# Patient Record
Sex: FEMALE | Race: WHITE | NOT HISPANIC OR LATINO | URBAN - METROPOLITAN AREA
[De-identification: names, ages, dates, MRNs, and addresses within clinical notes are randomized per-mention and may not be internally consistent; named-entity substitution may affect disease eponyms.]

---

## 2022-12-23 ENCOUNTER — EMERGENCY (EMERGENCY)
Facility: HOSPITAL | Age: 62
LOS: 1 days | Discharge: HOME | End: 2022-12-23
Attending: STUDENT IN AN ORGANIZED HEALTH CARE EDUCATION/TRAINING PROGRAM | Admitting: EMERGENCY MEDICINE
Payer: MEDICARE

## 2022-12-23 VITALS
TEMPERATURE: 98 F | RESPIRATION RATE: 16 BRPM | HEART RATE: 106 BPM | DIASTOLIC BLOOD PRESSURE: 94 MMHG | SYSTOLIC BLOOD PRESSURE: 140 MMHG | OXYGEN SATURATION: 100 %

## 2022-12-23 LAB
ALBUMIN SERPL ELPH-MCNC: 4.8 G/DL — SIGNIFICANT CHANGE UP (ref 3.3–5)
ALP SERPL-CCNC: 89 U/L — SIGNIFICANT CHANGE UP (ref 40–120)
ALT FLD-CCNC: 25 U/L — SIGNIFICANT CHANGE UP (ref 4–33)
ANION GAP SERPL CALC-SCNC: 11 MMOL/L — SIGNIFICANT CHANGE UP (ref 7–14)
APTT BLD: 32.6 SEC — SIGNIFICANT CHANGE UP (ref 27–36.3)
AST SERPL-CCNC: 24 U/L — SIGNIFICANT CHANGE UP (ref 4–32)
BASOPHILS # BLD AUTO: 0.04 K/UL — SIGNIFICANT CHANGE UP (ref 0–0.2)
BASOPHILS NFR BLD AUTO: 0.6 % — SIGNIFICANT CHANGE UP (ref 0–2)
BILIRUB SERPL-MCNC: 0.3 MG/DL — SIGNIFICANT CHANGE UP (ref 0.2–1.2)
BUN SERPL-MCNC: 14 MG/DL — SIGNIFICANT CHANGE UP (ref 7–23)
CALCIUM SERPL-MCNC: 9.9 MG/DL — SIGNIFICANT CHANGE UP (ref 8.4–10.5)
CHLORIDE SERPL-SCNC: 104 MMOL/L — SIGNIFICANT CHANGE UP (ref 98–107)
CO2 SERPL-SCNC: 25 MMOL/L — SIGNIFICANT CHANGE UP (ref 22–31)
CREAT SERPL-MCNC: 0.71 MG/DL — SIGNIFICANT CHANGE UP (ref 0.5–1.3)
EGFR: 97 ML/MIN/1.73M2 — SIGNIFICANT CHANGE UP
EOSINOPHIL # BLD AUTO: 0.13 K/UL — SIGNIFICANT CHANGE UP (ref 0–0.5)
EOSINOPHIL NFR BLD AUTO: 1.9 % — SIGNIFICANT CHANGE UP (ref 0–6)
FLUAV AG NPH QL: SIGNIFICANT CHANGE UP
FLUBV AG NPH QL: SIGNIFICANT CHANGE UP
GLUCOSE SERPL-MCNC: 120 MG/DL — HIGH (ref 70–99)
HCT VFR BLD CALC: 44 % — SIGNIFICANT CHANGE UP (ref 34.5–45)
HGB BLD-MCNC: 14.9 G/DL — SIGNIFICANT CHANGE UP (ref 11.5–15.5)
IANC: 4.33 K/UL — SIGNIFICANT CHANGE UP (ref 1.8–7.4)
IMM GRANULOCYTES NFR BLD AUTO: 0.1 % — SIGNIFICANT CHANGE UP (ref 0–0.9)
INR BLD: 1.02 RATIO — SIGNIFICANT CHANGE UP (ref 0.88–1.16)
LYMPHOCYTES # BLD AUTO: 1.9 K/UL — SIGNIFICANT CHANGE UP (ref 1–3.3)
LYMPHOCYTES # BLD AUTO: 27.1 % — SIGNIFICANT CHANGE UP (ref 13–44)
MCHC RBC-ENTMCNC: 29 PG — SIGNIFICANT CHANGE UP (ref 27–34)
MCHC RBC-ENTMCNC: 33.9 GM/DL — SIGNIFICANT CHANGE UP (ref 32–36)
MCV RBC AUTO: 85.6 FL — SIGNIFICANT CHANGE UP (ref 80–100)
MONOCYTES # BLD AUTO: 0.61 K/UL — SIGNIFICANT CHANGE UP (ref 0–0.9)
MONOCYTES NFR BLD AUTO: 8.7 % — SIGNIFICANT CHANGE UP (ref 2–14)
NEUTROPHILS # BLD AUTO: 4.33 K/UL — SIGNIFICANT CHANGE UP (ref 1.8–7.4)
NEUTROPHILS NFR BLD AUTO: 61.6 % — SIGNIFICANT CHANGE UP (ref 43–77)
NRBC # BLD: 0 /100 WBCS — SIGNIFICANT CHANGE UP (ref 0–0)
NRBC # FLD: 0 K/UL — SIGNIFICANT CHANGE UP (ref 0–0)
PLATELET # BLD AUTO: 195 K/UL — SIGNIFICANT CHANGE UP (ref 150–400)
POTASSIUM SERPL-MCNC: 3.9 MMOL/L — SIGNIFICANT CHANGE UP (ref 3.5–5.3)
POTASSIUM SERPL-SCNC: 3.9 MMOL/L — SIGNIFICANT CHANGE UP (ref 3.5–5.3)
PROT SERPL-MCNC: 8.1 G/DL — SIGNIFICANT CHANGE UP (ref 6–8.3)
PROTHROM AB SERPL-ACNC: 11.8 SEC — SIGNIFICANT CHANGE UP (ref 10.5–13.4)
RBC # BLD: 5.14 M/UL — SIGNIFICANT CHANGE UP (ref 3.8–5.2)
RBC # FLD: 12.9 % — SIGNIFICANT CHANGE UP (ref 10.3–14.5)
RSV RNA NPH QL NAA+NON-PROBE: SIGNIFICANT CHANGE UP
SARS-COV-2 RNA SPEC QL NAA+PROBE: SIGNIFICANT CHANGE UP
SODIUM SERPL-SCNC: 140 MMOL/L — SIGNIFICANT CHANGE UP (ref 135–145)
TROPONIN T, HIGH SENSITIVITY RESULT: <6 NG/L — SIGNIFICANT CHANGE UP
WBC # BLD: 7.02 K/UL — SIGNIFICANT CHANGE UP (ref 3.8–10.5)
WBC # FLD AUTO: 7.02 K/UL — SIGNIFICANT CHANGE UP (ref 3.8–10.5)

## 2022-12-23 PROCEDURE — 70498 CT ANGIOGRAPHY NECK: CPT | Mod: 26,MA

## 2022-12-23 PROCEDURE — 70496 CT ANGIOGRAPHY HEAD: CPT | Mod: 26,MA

## 2022-12-23 PROCEDURE — 99220: CPT | Mod: FS

## 2022-12-23 PROCEDURE — 0042T: CPT | Mod: MA

## 2022-12-23 RX ORDER — ATORVASTATIN CALCIUM 80 MG/1
80 TABLET, FILM COATED ORAL AT BEDTIME
Refills: 0 | Status: DISCONTINUED | OUTPATIENT
Start: 2022-12-23 | End: 2022-12-27

## 2022-12-23 RX ORDER — ASPIRIN/CALCIUM CARB/MAGNESIUM 324 MG
81 TABLET ORAL DAILY
Refills: 0 | Status: DISCONTINUED | OUTPATIENT
Start: 2022-12-23 | End: 2022-12-27

## 2022-12-23 RX ADMIN — Medication 81 MILLIGRAM(S): at 19:40

## 2022-12-23 RX ADMIN — ATORVASTATIN CALCIUM 80 MILLIGRAM(S): 80 TABLET, FILM COATED ORAL at 22:45

## 2022-12-23 NOTE — ED PROVIDER NOTE - PHYSICAL EXAMINATION
GENERAL: Awake. Alert. NAD. Well nourished.  HEENT: NC/AT, PERRL, EOMI, Conjunctiva pink, no scleral icterus. Airway patent. Moist mucous membranes.  LUNGS: CTAB. No wheezes or rales noted.  CARDIAC: Chest non-tender to palpation. RRR.  ABDOMEN: No masses noted. Soft, NT, ND, no rebound, no guarding.  EXT: No edema, no calf tenderness, distal pulses 2+ bilaterally  NEURO: A&Ox3. Moving all extremities. Sensation and strength intact throughout. No focal deficits. Normal finger to nose. No pronator drift. Normal gait.  SKIN: Warm and dry.  PSYCH: Normal affect.

## 2022-12-23 NOTE — ED ADULT NURSE REASSESSMENT NOTE - NS ED NURSE REASSESS COMMENT FT1
Patient received in stretcher. AOX4. Respirations even and unlabored. Smile symmetric. Speech spontaneous. +/= sensation BL. +/= strength BL. Spontaneous movement of all extremities Comfort and safety maintained. All current care needs met. Care plan continued Adilson CALZADA

## 2022-12-23 NOTE — ED PROVIDER NOTE - OBJECTIVE STATEMENT
62-year-old F PMH osteoarthritis presenting to the ED with transient episode of diplopia associated with dizziness, eye pressure and head pressure about 45min prior to arrival to ED.  Upon evaluation in the ED patient states diplopia has resolved however head pressures still persists.  Denies upper extremity and lower extremity weakness.  Patient ambulating at the time of initial symptoms.  No slurred speech or aphasia.  Patient denies similar episodes in the past.   Denies chest pain, SOB, nausea, vomiting, dysuria, hematuria, diarrhea..  Denies recent illness, fever, chills.

## 2022-12-23 NOTE — ED PROVIDER NOTE - PROGRESS NOTE DETAILS
Kristen Adams DO (PGY2):  CT negative for acute pathology.  Labs nonactionable. Patient to go to CDU for MRI.

## 2022-12-23 NOTE — CONSULT NOTE ADULT - SUBJECTIVE AND OBJECTIVE BOX
Neurology - Consult Note    -  Spectra: [phone] ([facility]), [phone] ([facility])  -    HPI: Patient XENIA ESCOBAR is a 62y (1960) RH woman with a PMHx significant for HLD, fibromyalgia, osteoarthritis  has presented to the ED as a code stroke for 1 minute transient binocular diplopia on primary gaze at 12:45 PM 12/23/22. Associated with pressure behind her eyes and top of her head, burning around her eyes for about 10 minutes around that time. Denies prior episodes of such symptoms. Pt currently has some lightheadedness but denies any HA, n/v, numbness, tingling, focal weakness, changes in vision/hearing, trauma. Pt ambulates independently at base line does not need help with ADLs. Pt not a tpa candidate as symptoms have resolved. Not a thrombectomy candidate as no LVO seen on imaging.    NIH 0  preMRS 0      Review of Systems:   CONSTITUTIONAL: No fevers or chills  EYES AND ENT: No visual changes or no throat pain   NECK: No pain or stiffness  RESPIRATORY: No hemoptysis or shortness of breath  NEUROLOGICAL: +As stated in HPI above  SKIN: No itching, burning, rashes, or lesions   All other review of systems is negative unless indicated above.    Allergies: NKDA      PMHx/PSHx/Family Hx: As above, otherwise see below       Social Hx:  No current use of tobacco, alcohol, or illicit drugs    Medications:  MEDICATIONS  (STANDING):    MEDICATIONS  (PRN):      Vitals:  T(C): 36.9 (12-23-22 @ 13:17), Max: 36.9 (12-23-22 @ 13:17)  HR: 106 (12-23-22 @ 13:17) (106 - 106)  BP: 140/94 (12-23-22 @ 13:17) (140/94 - 140/94)  RR: 16 (12-23-22 @ 13:17) (16 - 16)  SpO2: 100% (12-23-22 @ 13:17) (100% - 100%)    Physical Examination:   General - NAD  Cardiovascular - Peripheral pulses palpable, no edema  Eyes - Fundoscopy not performed due to safety precautions in the setting of the COVID-19 pandemic    Neurologic Exam:  Mental status - Awake, Alert, Oriented to person, place, and time. Speech fluent, repetition and naming intact. Follows simple and complex commands. Attention/concentration, recent and remote memory (including registration and recall), and fund of knowledge intact    Cranial nerves - PERRLA, VFF, EOMI, no diplopia, face sensation (V1-V3) intact b/l, facial strength intact without asymmetry b/l, hearing intact b/l, palate with symmetric elevation, sternocleidomastiod 5/5 strength b/l, tongue midline on protrusion with full lateral movement    Motor - Normal bulk and tone throughout. No pronator drift.  Strength testing  No drift noted in extremities. Full strength throughout.     Sensation - Light touch intact throughout    Coordination - Finger to Nose intact b/l. No tremors appreciated    Gait and station - Normal casual gait.     Labs:          CAPILLARY BLOOD GLUCOSE  121 (23 Dec 2022 13:52)      POCT Blood Glucose.: 121 mg/dL (23 Dec 2022 13:17)          CSF:                  Radiology:

## 2022-12-23 NOTE — CONSULT NOTE ADULT - ATTENDING COMMENTS
Ms. Henderson is a 61 yo woman with transient diplopia consistent with TIA.  I agree with work up and management as above.   Thank you.

## 2022-12-23 NOTE — ED CDU PROVIDER INITIAL DAY NOTE - ATTENDING APP SHARED VISIT CONTRIBUTION OF CARE
Demar Guillory, DO:  patient seen and evaluated with the PA.  I was present for key portions of the History & Physical, and I agree with the Impression & Plan. 63 yo f pmh fibromyalgia, OA, hld, pw diplopia. reports that had acute onset at approx 45 min pta. resolved gradually, ocurred suddenly. associated w/ mild frontal head pressure. reports NO current diplopia and no blurry vision but has eye pressure b/l. denies paresthesias, cp, sob, cough, congestion, ha. also had mild lightheadedness. has been in usual state of health. pt arrives hds, well appearing, grossly neurovascularly intact. asked to do code stroke at triage, initiated. neuro bedside at ct. concern for tia. cdu for further eval and w/u.

## 2022-12-23 NOTE — ED PROVIDER NOTE - ATTENDING CONTRIBUTION TO CARE
Demar Guillory, DO:  patient seen and evaluated with the resident.  I was present for key portions of the History & Physical, and I agree with the Impression & Plan. 65 yo f pmh fibromyalgia, OA, hld, pw diplopia. reports that had acute onset at approx 45 min pta. resolved gradually, ocurred suddenly. associated w/ mild frontal head pressure. reports NO current diplopia and no blurry vision but has eye pressure b/l. denies paresthesias, cp, sob, cough, congestion, ha. also had mild lightheadedness. has been in usual state of health. pt arrives hds, well appearing, grossly neurovascularly intact. asked to do code stroke at triage, initiated. neuro bedside at ct. unlikely acs, no hx. does not appear like pre-syncope. labs, imaging, reassess. Demar Guillory, DO:  patient seen and evaluated with the resident.  I was present for key portions of the History & Physical, and I agree with the Impression & Plan. 63 yo f pmh fibromyalgia, OA, hld, pw diplopia. reports that had acute onset at approx 45 min pta. resolved gradually, ocurred suddenly. associated w/ mild frontal head pressure. reports NO current diplopia and no blurry vision but has eye pressure b/l. denies paresthesias, cp, sob, cough, congestion, ha. also had mild lightheadedness. has been in usual state of health. pt arrives hds, well appearing, grossly neurovascularly intact. asked to do code stroke at triage, initiated. neuro bedside at ct. unlikely acs, no hx. does not appear like pre-syncope. labs, imaging, reassess..

## 2022-12-23 NOTE — ED CDU PROVIDER INITIAL DAY NOTE - CLINICAL SUMMARY MEDICAL DECISION MAKING FREE TEXT BOX
62 year old female with PMH of OA, fibromyalgia, HLD, disc herniation presents to the ED with transient binocular diplopia prior to arrival. In ED, pt with stable hemodynamics. Neuro non-focal. Labs reviewed. CTA H/N grossly unremarkable. Pt was seen by Neuro as a code stroke in the ED, recs appreciated. CDU plan for MRI brain, neuro checks and CDU supportive care.

## 2022-12-23 NOTE — ED PROVIDER NOTE - CLINICAL SUMMARY MEDICAL DECISION MAKING FREE TEXT BOX
62-year-old F PMH osteoarthritis presenting to the ED with transient episode of diplopia associated with dizziness, eye pressure and head pressure about 45min prior to arrival to ED. Given hx and physical, ddx includes but is not limited to stroke, TIA, dehydration, electrolyte abn, anemia. Code stroke called, CT/CTA, labs, neuro recs, reassess.

## 2022-12-23 NOTE — ED ADULT TRIAGE NOTE - CHIEF COMPLAINT QUOTE
c/o sudden onset of double vision while laying down watching tv. Double vision subsided but c/o dizziness and eye and head pressure. PB=453 Hx HLD c/o sudden onset of double vision while laying down watching tv. Double vision subsided but c/o dizziness and eye and head pressure. CX=832 Hx HLD, fibromyalgia. Dr. Guillory came to eval pt in triage. Code stroke activated.

## 2022-12-23 NOTE — CONSULT NOTE ADULT - ASSESSMENT
Patient XENIA ESCOBAR is a 62y (1960) RH woman with a PMHx significant for HLD, fibromyalgia, osteoarthritis  has presented to the ED as a code stroke for 1 minute transient binocular diplopia on primary gaze at 12:45 PM 12/23/22. Associated with pressure behind her eyes and top of her head, burning around her eyes for about 10 minutes around that time. Denies prior episodes of such symptoms. Pt currently has some lightheadedness but denies any HA, n/v, numbness, tingling, focal weakness, changes in vision/hearing, trauma. Pt ambulates independently at base line does not need help with ADLs. Pt not a tpa candidate as symptoms have resolved. Not a thrombectomy candidate as no LVO seen on imaging.    NIH 0  preMRS 0  ABCD 2    Neuro exam nonfocal.    Impression: Transient binocular diplopia of unclear etiology at this time, but possibly 2/2 TIA.     Recommendations:  Imaging:   -CDU with MRI brain w/o contrast, if no contraindications  -TTE  -Can consider STAT CT head non-contrast for change in neuro exam    Secondary prevention of stroke:  -Permissive HTN up to 220/110 for 24 hours from symptom onset, gradual normotension over 2-3 days  -81 mg ASA daily   -Atorvastatin 80 mg daily (long-term goal LDL < 70)  -Tight glucose control (long-term goal HgbA1c < 6%)    Misc/additional recs:   -Neuro checks Q4  -Dysphagia screen  -Speech and swallow eval if dysphagia screen fails  -NPO except meds until dysphagia screen passed  -Head of bed > 30 degrees for aspiration prevention and aspiration precautions ordered.  -Fall precautions, aspiration precautions  -Continuous  telemetry to monitor for arrhythmia  -Stroke labwork: HgbA1C, fasting lipid panel, CBC, CMP, coag pannel, troponin  -Baseline EKG  -PT/OT  -DVT Prophylaxis: Lovenox 40 mg Sq daily unless contraindicated in which case SCDs     To be discussed with neurology attending and will be formally staffed by attending during morning rounds. Recommendations will be finalized once signed by attending.

## 2022-12-23 NOTE — ED CDU PROVIDER INITIAL DAY NOTE - OBJECTIVE STATEMENT
Initial ED provider note: "62-year-old F PMH osteoarthritis presenting to the ED with transient episode of diplopia associated with dizziness, eye pressure and head pressure about 45min prior to arrival to ED.  Upon evaluation in the ED patient states diplopia has resolved however head pressures still persists.  Denies upper extremity and lower extremity weakness.  Patient ambulating at the time of initial symptoms.  No slurred speech or aphasia.  Patient denies similar episodes in the past.   Denies chest pain, SOB, nausea, vomiting, dysuria, hematuria, diarrhea..  Denies recent illness, fever, chills."    CDU note: Agree with above. 62 year old female with PMH of OA, fibromyalgia, HLD, disc herniation presents to the ED with transient binocular diplopia prior to arrival. In ED, pt with stable hemodynamics. Neuro non-focal. Labs reviewed. CTA H/N grossly unremarkable. Pt was seen by Neuro as a code stroke in the ED, recs appreciated. CDU plan for MRI brain, neuro checks and CDU supportive care.

## 2022-12-23 NOTE — ED CDU PROVIDER INITIAL DAY NOTE - NSICDXFAMILYHX_GEN_ALL_CORE_FT
FAMILY HISTORY:  Mother  Still living? Unknown  FH: gastric cancer, Age at diagnosis: Age Unknown  FH: heart disease, Age at diagnosis: Age Unknown

## 2022-12-24 VITALS
SYSTOLIC BLOOD PRESSURE: 114 MMHG | OXYGEN SATURATION: 100 % | RESPIRATION RATE: 18 BRPM | HEART RATE: 64 BPM | DIASTOLIC BLOOD PRESSURE: 71 MMHG | TEMPERATURE: 98 F

## 2022-12-24 LAB
CHOLEST SERPL-MCNC: 148 MG/DL — SIGNIFICANT CHANGE UP
HDLC SERPL-MCNC: 45 MG/DL — LOW
LIPID PNL WITH DIRECT LDL SERPL: 77 MG/DL — SIGNIFICANT CHANGE UP
NON HDL CHOLESTEROL: 103 MG/DL — SIGNIFICANT CHANGE UP
TRIGL SERPL-MCNC: 132 MG/DL — SIGNIFICANT CHANGE UP

## 2022-12-24 PROCEDURE — 93306 TTE W/DOPPLER COMPLETE: CPT | Mod: 26

## 2022-12-24 PROCEDURE — 70551 MRI BRAIN STEM W/O DYE: CPT | Mod: 26,MA

## 2022-12-24 PROCEDURE — 99285 EMERGENCY DEPT VISIT HI MDM: CPT

## 2022-12-24 PROCEDURE — 99217: CPT

## 2022-12-24 RX ADMIN — Medication 81 MILLIGRAM(S): at 12:14

## 2022-12-24 NOTE — ED CDU PROVIDER DISPOSITION NOTE - PATIENT PORTAL LINK FT
You can access the FollowMyHealth Patient Portal offered by [facility] by registering at the following website: [email or web address]. By joining [employer] [facility]’s FollowMyHealth portal, you will also be able to view your health information using other applications (apps) compatible with our system.

## 2022-12-24 NOTE — ED CDU PROVIDER SUBSEQUENT DAY NOTE - HISTORY
61 yo female with PMH of OA, fibromyalgia, hyperlipidemia, disc herniation, presented to the ED c/o episode of transient binocular diplopia prior to ED arrival. In ED, VSS, Neuro exam unremarkable.  Neurology was consulted as colde stroke; CTA H/N grossly unremarkable.  Neuro advised MRI head; pt was dispo'd to CDU accordingly for plan: Tele monitoring, neuro checks, MRI head / recs as per Neuro team following patient; supportive care, general observation care / monitoring.  In the interim, pt objectively noted to be resting comfortably; pt has been clinically stable; no issues thus far.

## 2022-12-24 NOTE — ED ADULT NURSE NOTE - CHIEF COMPLAINT QUOTE
c/o sudden onset of double vision while laying down watching tv. Double vision subsided but c/o dizziness and eye and head pressure. PJ=554 Hx HLD, fibromyalgia. Dr. Guillory came to eval pt in triage. Code stroke activated.

## 2022-12-24 NOTE — ED CDU PROVIDER SUBSEQUENT DAY NOTE - PHYSICAL EXAMINATION
CONSTITUTIONAL:  Well appearing, awake, alert, oriented to person, place, time/situation and in no apparent distress.  Pt. is objectively comfortable appearing and verbalizing in full, clear, effortless sentences.  ENMT: NC/AT.  Airway patent.  Nasal mucosa clear.  Moist mucous membranes.  Neck supple.  EYES:  Clear OU.  EOMI OU.  CARDIAC:  Normal rate, regular rhythm.  Heart sounds S1 S2.  No murmurs, gallops, or rubs.  RESPIRATORY:  Breath sounds clear and equal bilaterally.  No wheezes, no rales, no rhonchi.  GASTROINTESTINAL:  Abdomen soft, non-distended, non-tender.  No rebound, no guarding.  NEUROLOGICAL:  Alert and oriented to person/place/time/situation.  No focal deficits; no tremors noted.   MUSCULOSKELETAL:  Range of motion is not limited.  Gait stable.    SKIN:  Skin color unremarkable.  Skin warm, dry, and intact.    PSYCHIATRIC:  Alert and oriented to person/place/time/situation.  Mood and affect WNL.  No apparent risk to self or others.

## 2022-12-24 NOTE — ED ADULT NURSE NOTE - CODE STROKE ACTIVATED DT
23-Dec-2022 13:26
GOAL: Pt will improve BUE/BLE strength by 1/2 grade in 2wks to improve overall functional mobility

## 2022-12-24 NOTE — ED CDU PROVIDER DISPOSITION NOTE - CLINICAL COURSE
62 year old female with PMH of OA, fibromyalgia, HLD, disc herniation presents to the ED with transient binocular diplopia prior to arrival. In ED, pt with stable hemodynamics. Neuro non-focal. Labs reviewed. CTA H/N grossly unremarkable. Pt was seen by Neuro as a code stroke in the ED, recs appreciated. CDU plan for MRI brain, neuro checks and CDU supportive care.  MRi and echo done which was wnl, neuro intact, vss, to be discharged with neurology follow up.

## 2022-12-24 NOTE — ED CDU PROVIDER DISPOSITION NOTE - NSFOLLOWUPINSTRUCTIONS_ED_ALL_ED_FT
Take aspirin daily.  Take atoravastatin 40mg once a day at bedtime.  Follow up with your PCP next week.  Follow up with neurologist Dr. Goode [address] [facility] [phone] within 1-2 weeks.   Return to Ed for any headache, visual changes, weakness, chest pain, shortness of breath or any other concerning symptoms.

## 2022-12-24 NOTE — ED CDU PROVIDER SUBSEQUENT DAY NOTE - NSICDXPASTMEDICALHX_GEN_ALL_CORE_FT
PAST MEDICAL HISTORY:  Fibromyalgia     Hyperlipidemia     Lumbar herniated disc     Osteoarthritis